# Patient Record
Sex: FEMALE | Race: WHITE | NOT HISPANIC OR LATINO | Employment: FULL TIME | URBAN - NONMETROPOLITAN AREA
[De-identification: names, ages, dates, MRNs, and addresses within clinical notes are randomized per-mention and may not be internally consistent; named-entity substitution may affect disease eponyms.]

---

## 2018-06-22 ENCOUNTER — HISTORICAL (OUTPATIENT)
Dept: ADMINISTRATIVE | Facility: HOSPITAL | Age: 38
End: 2018-06-22

## 2020-07-07 ENCOUNTER — HISTORICAL (OUTPATIENT)
Dept: ADMINISTRATIVE | Facility: HOSPITAL | Age: 40
End: 2020-07-07

## 2022-04-12 ENCOUNTER — HISTORICAL (OUTPATIENT)
Dept: ADMINISTRATIVE | Facility: HOSPITAL | Age: 42
End: 2022-04-12

## 2022-04-25 ENCOUNTER — HISTORICAL (OUTPATIENT)
Dept: ADMINISTRATIVE | Facility: HOSPITAL | Age: 42
End: 2022-04-25

## 2023-05-20 ENCOUNTER — HOSPITAL ENCOUNTER (OUTPATIENT)
Dept: RADIOLOGY | Facility: HOSPITAL | Age: 43
Discharge: HOME | End: 2023-05-20
Attending: NURSE PRACTITIONER
Payer: COMMERCIAL

## 2023-05-20 DIAGNOSIS — N63.0 LUMP OR MASS IN BREAST: ICD-10-CM

## 2023-05-20 PROCEDURE — 76641 ULTRASOUND BREAST COMPLETE: CPT | Mod: TC,RT

## 2024-01-09 ENCOUNTER — OFFICE VISIT (OUTPATIENT)
Dept: FAMILY MEDICINE | Facility: CLINIC | Age: 44
End: 2024-01-09
Payer: COMMERCIAL

## 2024-01-09 VITALS
HEART RATE: 72 BPM | SYSTOLIC BLOOD PRESSURE: 122 MMHG | TEMPERATURE: 99 F | DIASTOLIC BLOOD PRESSURE: 60 MMHG | OXYGEN SATURATION: 98 % | HEIGHT: 67 IN | BODY MASS INDEX: 31.52 KG/M2 | WEIGHT: 200.81 LBS

## 2024-01-09 DIAGNOSIS — Z00.01 ENCOUNTER FOR WELL ADULT EXAM WITH ABNORMAL FINDINGS: Primary | ICD-10-CM

## 2024-01-09 DIAGNOSIS — Z82.49 FAMILY HISTORY OF EARLY CAD: ICD-10-CM

## 2024-01-09 DIAGNOSIS — Z83.3 FAMILY HISTORY OF DIABETES MELLITUS (DM): ICD-10-CM

## 2024-01-09 DIAGNOSIS — J30.1 SEASONAL ALLERGIC RHINITIS DUE TO POLLEN: ICD-10-CM

## 2024-01-09 PROCEDURE — 3078F DIAST BP <80 MM HG: CPT | Mod: CPTII,,, | Performed by: FAMILY MEDICINE

## 2024-01-09 PROCEDURE — 3008F BODY MASS INDEX DOCD: CPT | Mod: CPTII,,, | Performed by: FAMILY MEDICINE

## 2024-01-09 PROCEDURE — 99386 PREV VISIT NEW AGE 40-64: CPT | Mod: ,,, | Performed by: FAMILY MEDICINE

## 2024-01-09 PROCEDURE — 1160F RVW MEDS BY RX/DR IN RCRD: CPT | Mod: CPTII,,, | Performed by: FAMILY MEDICINE

## 2024-01-09 PROCEDURE — 1159F MED LIST DOCD IN RCRD: CPT | Mod: CPTII,,, | Performed by: FAMILY MEDICINE

## 2024-01-09 PROCEDURE — 3074F SYST BP LT 130 MM HG: CPT | Mod: CPTII,,, | Performed by: FAMILY MEDICINE

## 2024-01-09 NOTE — ASSESSMENT & PLAN NOTE
Counseled on healthy cardiovascular activity with resistance training and healthy lifestyle choices     We will check fasting labs soon and call with results.    Otherwise, we will scheduled for return visit in 1 year with lab work after review of her fasting labs here in the next 1-2 weeks.  We will see her back sooner, if needed.

## 2024-01-09 NOTE — PROGRESS NOTES
"SUBJECTIVE:  HPI    Shante Crews is a 43 y.o. female here for [facility] (Establish care).     Both her mother and father has a history of early coronary artery disease and diabetes.  She is here to [facility].    Other than some seasonal allergic rhinitis symptoms, she is without any additional complaints.  She reports sneezing, runny nose and nasal congestion during certain times of the year.  All 1st and 2nd generation antihistamines make her drowsy.      Shante's allergies, medications, history, and problem list were updated as appropriate.    ROS:  Pertinent ROS as above, otherwise negative    OBJECTIVE:  Vital signs  Visit Vitals  /60 (BP Location: Left arm)   Pulse 72   Temp 98.5 °F (36.9 °C) (Temporal)   Ht 5' 7" (1.702 m)   Wt 91.1 kg (200 lb 12.8 oz)   SpO2 98%   BMI 31.45 kg/m²          PHYSICAL EXAM:  General: Awake, alert, no acute distress  ENT:  External auditory canals normal bilaterally .  Tympanic membranes normal bilaterally.  Oropharynx clear.  Bilateral turbinate edema pale turbinates  Neck:  No bruits, no masses  Cardiovascular:  Regular rhythm.  Normal rate.  No murmurs.  Respiratory: Clear to auscultation bilaterally, normal effort  Abdomen: Soft, nontender, nondistended, no hepatosplenomegaly   Extremities:  No cyanosis, no clubbing, no edema  Skin:  No rashes or appreciable lesions   Neuro:  Cranial nerves 2-12 are intact with usual testing.  Gait is normal.  Moves all 4 extremities equally and symmetrically.  Psychiatric:  Normal mood and affect.      ASSESSMENT/PLAN:  1. Encounter for well adult exam with abnormal findings  Assessment & Plan:  Counseled on healthy cardiovascular activity with resistance training and healthy lifestyle choices     We will check fasting labs soon and call with results.    Otherwise, we will scheduled for return visit in 1 year with lab work after review of her fasting labs here in the next 1-2 weeks.  We will see her back sooner, if " needed.    Orders:  -     CBC Auto Differential; Future; Expected date: 01/09/2024  -     Comprehensive Metabolic Panel; Future; Expected date: 01/09/2024  -     Hemoglobin A1C; Future; Expected date: 01/09/2024  -     Lipid Panel; Future; Expected date: 02/09/2024  -     TSH; Future; Expected date: 01/09/2024    2. Seasonal allergic rhinitis due to pollen  Assessment & Plan:  Trial of fluticasone nasal spray      3. Family history of early CAD  Overview:  Mother with MI  Father with MI    Assessment & Plan:  Check fasting lipid profile for screening purposes    Orders:  -     CBC Auto Differential; Future; Expected date: 01/09/2024  -     Comprehensive Metabolic Panel; Future; Expected date: 01/09/2024  -     Hemoglobin A1C; Future; Expected date: 01/09/2024  -     Lipid Panel; Future; Expected date: 02/09/2024  -     TSH; Future; Expected date: 01/09/2024    4. Family history of diabetes mellitus (DM)  Overview:  Mother and father    Assessment & Plan:  Screen for diabetes with hemoglobin A1c and fasting blood glucose    Orders:  -     CBC Auto Differential; Future; Expected date: 01/09/2024  -     Comprehensive Metabolic Panel; Future; Expected date: 01/09/2024  -     Hemoglobin A1C; Future; Expected date: 01/09/2024  -     Lipid Panel; Future; Expected date: 02/09/2024  -     TSH; Future; Expected date: 01/09/2024        Follow Up:  Follow up for Fasting labs.  Call with these results and then scheduled for return visit in 1 year, unless she needs to come back sooner based on results.

## 2024-04-15 PROBLEM — Z00.01 ENCOUNTER FOR WELL ADULT EXAM WITH ABNORMAL FINDINGS: Status: RESOLVED | Noted: 2024-01-09 | Resolved: 2024-04-15

## 2024-06-18 ENCOUNTER — TELEPHONE (OUTPATIENT)
Dept: FAMILY MEDICINE | Facility: CLINIC | Age: 44
End: 2024-06-18

## 2024-06-18 ENCOUNTER — OFFICE VISIT (OUTPATIENT)
Dept: FAMILY MEDICINE | Facility: CLINIC | Age: 44
End: 2024-06-18
Payer: OTHER GOVERNMENT

## 2024-06-18 ENCOUNTER — HOSPITAL ENCOUNTER (OUTPATIENT)
Dept: RADIOLOGY | Facility: HOSPITAL | Age: 44
Discharge: HOME | End: 2024-06-18
Attending: FAMILY MEDICINE
Payer: OTHER GOVERNMENT

## 2024-06-18 VITALS
HEIGHT: 67 IN | WEIGHT: 198.81 LBS | TEMPERATURE: 98 F | SYSTOLIC BLOOD PRESSURE: 112 MMHG | DIASTOLIC BLOOD PRESSURE: 82 MMHG | HEART RATE: 73 BPM | OXYGEN SATURATION: 98 % | BODY MASS INDEX: 31.2 KG/M2

## 2024-06-18 DIAGNOSIS — M54.31 SCIATICA OF RIGHT SIDE: Primary | ICD-10-CM

## 2024-06-18 DIAGNOSIS — M54.31 SCIATICA OF RIGHT SIDE: ICD-10-CM

## 2024-06-18 PROCEDURE — 72114 X-RAY EXAM L-S SPINE BENDING: CPT | Mod: TC

## 2024-06-18 PROCEDURE — 99214 OFFICE O/P EST MOD 30 MIN: CPT | Mod: ,,, | Performed by: FAMILY MEDICINE

## 2024-06-18 RX ORDER — DICLOFENAC SODIUM 50 MG/1
50 TABLET, DELAYED RELEASE ORAL 2 TIMES DAILY
COMMUNITY
Start: 2024-06-14

## 2024-06-18 RX ORDER — HYDROCODONE BITARTRATE AND ACETAMINOPHEN 5; 325 MG/1; MG/1
1 TABLET ORAL EVERY 6 HOURS PRN
Qty: 12 TABLET | Refills: 0 | Status: SHIPPED | OUTPATIENT
Start: 2024-06-18

## 2024-06-18 RX ORDER — TIZANIDINE 4 MG/1
4 TABLET ORAL 2 TIMES DAILY PRN
COMMUNITY
Start: 2024-06-14

## 2024-06-18 RX ORDER — PREDNISONE 10 MG/1
TABLET ORAL
Qty: 54 TABLET | Refills: 0 | Status: SHIPPED | OUTPATIENT
Start: 2024-06-18 | End: 2024-07-07

## 2024-06-18 NOTE — ASSESSMENT & PLAN NOTE
Probably secondary to disc herniation.  Discussed natural course and prognosis and treatment recommendations.    Prednisone taper over 20 days     Hydrocodone acetaminophen as needed for pain     Check x-rays     Return to clinic in 3 weeks

## 2024-06-18 NOTE — PROGRESS NOTES
"SUBJECTIVE:  HPI    Shante Crews is a 43 y.o. female here for Back Pain and sciatic nerve pain.     She reports an approximately one-week history of right-sided low back pain.  The pain is constant but exacerbates with certain movements.  The pain radiates into the right gluteal region.  This is similar pain to what she had about a year ago.  She had x-rays and an MRI at that time.  Her symptoms resolve with physical therapy.    On this occasion, her pain began after she flexed at the trunk in her kitchen and lifted something.  She denies any paresthesias or weakness.  She denies any bowel or bladder dysfunction.  However, she does note that she has had some stress urinary incontinence of mild severity over the last several months.    Shante's allergies, medications, history, and problem list were updated as appropriate.    ROS:  Pertinent ROS as above, otherwise negative    OBJECTIVE:  Vital signs  Visit Vitals  /82 (BP Location: Right arm, Patient Position: Sitting)   Pulse 73   Temp 97.5 °F (36.4 °C) (Temporal)   Ht 5' 7.01" (1.702 m)   Wt 90.2 kg (198 lb 12.8 oz)   SpO2 98%   BMI 31.13 kg/m²          PHYSICAL EXAM:  General: Awake, alert, no acute distress  Back: Point tenderness to palpation in the right gluteal region over the sciatic nerve.  Increased pain with hyperextension and right leg crossed over left.  Straight leg raise positive on the right supine and sitting.  Neuro:  Motor 5/5 in bilateral lower extremities.  Sensation light touch intact bilateral lower extremities.  Reflexes 1+ bilateral lower extremities.      ASSESSMENT/PLAN:  1. Sciatica of right side  Assessment & Plan:  Probably secondary to disc herniation.  Discussed natural course and prognosis and treatment recommendations.    Prednisone taper over 20 days     Hydrocodone acetaminophen as needed for pain     Check x-rays     Return to clinic in 3 weeks    Orders:  -     predniSONE (DELTASONE) 10 MG tablet; Take 6 tablets (60 mg " total) by mouth once daily for 4 days, THEN 4 tablets (40 mg total) once daily for 4 days, THEN 2 tablets (20 mg total) once daily for 4 days, THEN 1 tablet (10 mg total) once daily for 4 days, THEN 0.5 tablets (5 mg total) once daily for 4 days.  Dispense: 54 tablet; Refill: 0  -     HYDROcodone-acetaminophen (NORCO) 5-325 mg per tablet; Take 1 tablet by mouth every 6 (six) hours as needed for Pain.  Dispense: 12 tablet; Refill: 0  -     X-Ray Lumbar Complete Including Flex And Ext; Future; Expected date: 06/18/2024      Follow Up:  Follow up in about 3 weeks (around 7/9/2024) for Right-sided sciatica.

## 2024-06-18 NOTE — TELEPHONE ENCOUNTER
----- Message from López Robles MD sent at 6/18/2024  4:40 PM CDT -----  X-rays do confirm mild arthritic and degenerative disc changes    We will discuss further on her return to clinic visit   [employer] [facility]

## 2024-07-09 ENCOUNTER — OFFICE VISIT (OUTPATIENT)
Dept: OBSTETRICS AND GYNECOLOGY | Facility: CLINIC | Age: 44
End: 2024-07-09
Payer: OTHER GOVERNMENT

## 2024-07-09 ENCOUNTER — OFFICE VISIT (OUTPATIENT)
Dept: FAMILY MEDICINE | Facility: CLINIC | Age: 44
End: 2024-07-09
Payer: OTHER GOVERNMENT

## 2024-07-09 VITALS
BODY MASS INDEX: 31.55 KG/M2 | WEIGHT: 201 LBS | SYSTOLIC BLOOD PRESSURE: 120 MMHG | TEMPERATURE: 98 F | DIASTOLIC BLOOD PRESSURE: 70 MMHG | HEIGHT: 67 IN

## 2024-07-09 VITALS
BODY MASS INDEX: 31.55 KG/M2 | SYSTOLIC BLOOD PRESSURE: 118 MMHG | HEART RATE: 73 BPM | HEIGHT: 67 IN | TEMPERATURE: 98 F | OXYGEN SATURATION: 96 % | DIASTOLIC BLOOD PRESSURE: 64 MMHG | WEIGHT: 201 LBS

## 2024-07-09 DIAGNOSIS — R31.9 HEMATURIA, UNSPECIFIED TYPE: ICD-10-CM

## 2024-07-09 DIAGNOSIS — M47.816 SPONDYLOSIS OF LUMBAR REGION WITHOUT MYELOPATHY OR RADICULOPATHY: Primary | ICD-10-CM

## 2024-07-09 DIAGNOSIS — Z01.411 ABNORMAL GYNECOLOGICAL EXAMINATION: Primary | ICD-10-CM

## 2024-07-09 DIAGNOSIS — M54.31 SCIATICA OF RIGHT SIDE: ICD-10-CM

## 2024-07-09 DIAGNOSIS — N39.3 URINARY, INCONTINENCE, STRESS FEMALE: ICD-10-CM

## 2024-07-09 DIAGNOSIS — R32 BLADDER LEAK: ICD-10-CM

## 2024-07-09 DIAGNOSIS — Z23 NEED FOR HPV VACCINATION: ICD-10-CM

## 2024-07-09 LAB
BILIRUB UR QL STRIP: NEGATIVE
GLUCOSE UR QL STRIP: NEGATIVE
KETONES UR QL STRIP: NEGATIVE
LEUKOCYTE ESTERASE UR QL STRIP: NEGATIVE
PH, POC UA: 5.5
POC BLOOD, URINE: POSITIVE
POC NITRATES, URINE: NEGATIVE
PROT UR QL STRIP: NEGATIVE
SP GR UR STRIP: 1.03 (ref 1–1.03)
UROBILINOGEN UR STRIP-ACNC: 0.2 (ref 0.1–1.1)

## 2024-07-09 PROCEDURE — 90471 IMMUNIZATION ADMIN: CPT | Mod: ,,, | Performed by: NURSE PRACTITIONER

## 2024-07-09 PROCEDURE — 90651 9VHPV VACCINE 2/3 DOSE IM: CPT | Mod: ,,, | Performed by: NURSE PRACTITIONER

## 2024-07-09 PROCEDURE — 87624 HPV HI-RISK TYP POOLED RSLT: CPT | Performed by: NURSE PRACTITIONER

## 2024-07-09 PROCEDURE — 81003 URINALYSIS AUTO W/O SCOPE: CPT | Mod: QW,,, | Performed by: NURSE PRACTITIONER

## 2024-07-09 PROCEDURE — 87591 N.GONORRHOEAE DNA AMP PROB: CPT | Performed by: NURSE PRACTITIONER

## 2024-07-09 PROCEDURE — 99213 OFFICE O/P EST LOW 20 MIN: CPT | Mod: ,,, | Performed by: FAMILY MEDICINE

## 2024-07-09 PROCEDURE — 99386 PREV VISIT NEW AGE 40-64: CPT | Mod: 25,,, | Performed by: NURSE PRACTITIONER

## 2024-07-09 PROCEDURE — 87491 CHLMYD TRACH DNA AMP PROBE: CPT | Performed by: NURSE PRACTITIONER

## 2024-07-09 PROCEDURE — 87086 URINE CULTURE/COLONY COUNT: CPT | Performed by: NURSE PRACTITIONER

## 2024-07-09 PROCEDURE — 87661 TRICHOMONAS VAGINALIS AMPLIF: CPT | Performed by: NURSE PRACTITIONER

## 2024-07-09 NOTE — ASSESSMENT & PLAN NOTE
Discussed prognosis     Discussed signs and symptoms and reasons to return    Discussed preventative measures

## 2024-07-09 NOTE — PROGRESS NOTES
Chief Complaint: Annual exam    Chief Complaint   Patient presents with    Well Woman     Annual, pt states last pap was 2 years ago abnormal cells with hx of HPV. KIMBERLYN signed. Pt would like to discuss bladder issues.        HPI:   43 y.o. F  presents for initial gyn exam.    Reports recent bladder leakage x last 2 months, onset after using vibrator with suction. Stopped using vibrator and symptoms have improved somewhat.  Reports leakage with coughing, sneezing, denies urgency, frequency, nocturia, dysuria, pressure.      Due for MMG.  Last pap 2 years ago with HPV.  Recently dealing with sciatica      Labs / Significant Studies:  Gyn History:    Menstrual History   Cycle: No  Menarche Age: 14 years  No Cycle Reason: (!) Surgical  Surgical Reason: ablation  [place]  Sexually Active: Yes  Sexual Orientation: homosexual  Postcoital Bleeding: No  Dyspareunia: No  STI History: Yes  STI Type:  (hpv)  Contraception: N/a  Menopause  Menopause Age: 1 years  Post Menopausal Bleeding: No  Hormone Replacement Therapy: No  Breast History  Last Breast Imaging Date: Yes  Date: 23  History of Abnormal Breast Imaging : (!) Yes (benign)  History of Breast Biopsy: No  Pap History   Last pap date:  (2 years ago abnormal per pt.)  Result: (!) Abnormal  History of Abnormal Pap: (!) Yes  HPV Vaccine Completed: No        Family History   Problem Relation Name Age of Onset    Breast cancer Neg Hx      Cervical cancer Neg Hx      Ovarian cancer Neg Hx      Uterine cancer Neg Hx           Past Medical History:   Diagnosis Date    Spondylosis of lumbar region without myelopathy or radiculopathy 2024 lumbar spine x-rays:  mild to moderate facet joint to lesser extent vertebral body spurring noted throughout the lumbar spine. Minimal, bilaterally symmetric degenerative changes are noted involving both SI joints        Past Surgical History:   Procedure Laterality Date    ABLATION      HAND TENDON SURGERY  "Left     TUBAL LIGATION  2007     No current outpatient medications on file.    Review of patient's allergies indicates:  No Known Allergies    Social History     Tobacco Use    Smoking status: Never    Smokeless tobacco: Never   Substance Use Topics    Alcohol use: Yes     Comment: socially    Drug use: Never       Review of Systems:  General/Constitutional: Chills denies. Fatigue/weakness denies. Fever denies. Night sweats denies. Hot flashes denies    Respiratory: Cough denies. Hemoptysis denies. SOB denies. Sputum production denies. Wheezing denies .   Cardiovascular: Chest pain denies . Dizziness denies. Palpitations denies. Swelling in hands/feet denies    Gastrointestinal: Abdominal pain denies. Blood in stool denies. Constipation denies. Diarrhea denies. Heartburn denies. Nausea denies. Vomiting denies    Genitourinary: Incontinence denies. Blood in urine denies. Frequent urination denies. Painful urination denies. Urinary urgency denies. Nocturia denies    Gynecologic: Irregular menses denies. Heavy bleeding denies. Painful menses denies. Vaginal discharge denies. Vaginal odor denies. Vaginal itching denies. Vaginal lesion denies. Pelvic pain denies. Decreased libido denies. Vulvar lesion denies. Prolapse of genital organs denies. Painful intercourse denies. Postcoital bleeding denies    Psychiatric: Depression denies. Anxiety denies     Physical Exam:   Vitals:    07/09/24 1454   BP: 120/70   Temp: 98.2 °F (36.8 °C)   Weight: 91.2 kg (201 lb)   Height: 5' 7" (1.702 m)       Body mass index is 31.48 kg/m².       Chaperone: present.     General appearance: healthy, well-nourished and well-developed     Psychiatric: Orientation to time, place and person. Normal mood and affect and active, alert     Skin: Appearance: no rashes or lesions.     Neck:   Neck: supple, FROM, trachea midline. and no masses   Thyroid: no enlargement or nodules and non-tender.       Cardiovascular:   Auscultation: RRR and no murmur. "   Peripheral Vascular: no varicosities, LLE edema, RLE edema, calf tenderness, and palpable cord and pedal pulses intact.     Lungs:   Respiratory effort: no intercostal retractions or accessory muscle usage.   Auscultation: no wheezing, rales/crackles, or rhonchi and clear to auscultation.     Breast:   Inspection/Palpation: no tenderness, discrete/distinct masses, skin changes, or abnormal secretions. Nipple appearance normal.     Abdomen:   Auscultation/Inspection/Palpation: no hepatomegaly, splenomegaly, masses, tenderness or CVA tenderness and soft, non-distended bowel sounds preset.    Hernia: no palpable hernias.     Female Genitalia:    Vulva: no masses, tenderness or lesions    Bladder/Urethra: no urethral discharge or mass, normal meatus, bladder non-distended.    Vagina: no tenderness, erythema, cystocele, rectocele, abnormal vaginal discharge or vesicle(s) or ulcers    Cervix: no discharge, no cervical lacerations noted or motion tenderness and grossly normal    Uterus: normal size and shape and midline, non-tender, and no uterine prolapse.    Adnexa/Parametria: no parametrial tenderness or mass, no adnexal tenderness or ovarian mass.     Lymph Nodes:   Palpation: non tender submandibular nodes, axillary nodes, or inguinal nodes.     Rectal Exam:   Rectum: normal perianal skin.       Assessment:     Patient Active Problem List   Diagnosis    Family history of early CAD    Family history of diabetes mellitus (DM)    Seasonal allergic rhinitis due to pollen    Sciatica of right side    Spondylosis of lumbar region without myelopathy or radiculopathy       Health Maintenance Due   Topic Date Due    Hepatitis C Screening  Never done    Lipid Panel  Never done    HIV Screening  Never done    TETANUS VACCINE  Never done    Mammogram  Never done    Hemoglobin A1c (Diabetic Prevention Screening)  08/02/2014    Cervical Cancer Screening  09/18/2017    COVID-19 Vaccine (1 - 2023-24 season) Never done     Health  Maintenance Topics with due status: Not Due       Topic Last Completion Date    Influenza Vaccine Not Due         Plan:    Shante was seen today for well woman.    Diagnoses and all orders for this visit:    Abnormal gynecological examination  PAP with cultures  Counseled regarding contraceptive options, and need for contraception until no menses for 1 year.  Reviewed calcium needs, exercise, and prevention of osteoporosis.  Healthy diet and light weightbearing exercise if tolerated.  Reviewed normal perimenopausal transition.  Mammogram recommended yearly.  Colonoscopy recommended at age 45.  RTC 1 y   Bladder leak  -     POCT Urinalysis, Dipstick, Automated, W/O Scope  -     Urine Culture High Risk    Urinary, incontinence, stress female  Encouraged pelvic floor strengthening exercises  Need for HPV vaccination    - HPV infection is acquired through direct genital contact.     - Educated she may be at risk for other sexually transmitted diseases     - Advised pt on Gardasil vaccine and correct doses to be administered     - Pt tolerated well and will return 2 months for next injection.     Hematuria, unspecified type  Urine C&S

## 2024-07-12 LAB — BACTERIA UR CULT: NO GROWTH

## 2024-12-17 ENCOUNTER — OFFICE VISIT (OUTPATIENT)
Dept: OBSTETRICS AND GYNECOLOGY | Facility: CLINIC | Age: 44
End: 2024-12-17
Payer: OTHER GOVERNMENT

## 2024-12-17 ENCOUNTER — LAB VISIT (OUTPATIENT)
Dept: LAB | Facility: HOSPITAL | Age: 44
End: 2024-12-17
Attending: NURSE PRACTITIONER
Payer: OTHER GOVERNMENT

## 2024-12-17 VITALS
BODY MASS INDEX: 31.39 KG/M2 | SYSTOLIC BLOOD PRESSURE: 112 MMHG | WEIGHT: 200 LBS | HEIGHT: 67 IN | DIASTOLIC BLOOD PRESSURE: 80 MMHG | TEMPERATURE: 97 F

## 2024-12-17 DIAGNOSIS — N95.1 MENOPAUSAL SYMPTOMS: ICD-10-CM

## 2024-12-17 DIAGNOSIS — Z23 NEED FOR HPV VACCINATION: Primary | ICD-10-CM

## 2024-12-17 LAB
ESTRADIOL SERPL HS-MCNC: 58 PG/ML
FSH SERPL-ACNC: 21.84 MIU/ML

## 2024-12-17 PROCEDURE — 83001 ASSAY OF GONADOTROPIN (FSH): CPT

## 2024-12-17 PROCEDURE — 36415 COLL VENOUS BLD VENIPUNCTURE: CPT

## 2024-12-17 PROCEDURE — 82670 ASSAY OF TOTAL ESTRADIOL: CPT

## 2024-12-17 NOTE — PROGRESS NOTES
Chief Complaint:     Chief Complaint   Patient presents with    Gardasil     2nd Gardasil, last injection 24. Pt would also like to discuss hot flashes, mood swings, and insomnia x7 months.          HPI:   44 y.o.  F   presents for 2nd Gardasil injection.   Patient complains of hot flashes, mood swings and insomnia x7 months.  Patient is status post tubal and endometrial ablation, does not have any vaginal bleeding.      Gyn History:    Menstrual History  Cycle: No  Menarche Age: 14 years  No Cycle Reason: (!) Surgical  Surgical Reason: ablation    Menopause  Menopause Age: 1 years  Post Menopausal Bleeding: No  Hormone Replacement Therapy: No    Pap History  Last pap date: 24  Result: Normal  History of Abnormal Pap: (!) Yes (per pt 2 years ago)  HPV Vaccine Completed: No (1/3)    [place]  Sexually Active: Yes  Sexual Orientation: homosexual  Postcoital Bleeding: No  Dyspareunia: No  STI History: Yes (HPV)  Contraception: Yes    Breast History  Last Breast Imaging Date: Yes  Date: 23  History of Abnormal Breast Imaging : (!) Yes  History of Breast Biopsy: No        No current outpatient medications on file.  No current facility-administered medications for this visit.    Review of patient's allergies indicates:  No Known Allergies    Social History     Tobacco Use    Smoking status: Never    Smokeless tobacco: Never   Substance Use Topics    Alcohol use: Yes     Comment: socially    Drug use: Never       Review of Systems:  General/Constitutional:  Chills denies. Fatigue/weakness denies. Fever denies . Night sweats admits hot flashes admits.  Mood swings admits  Gastrointestinal:  Abdominal pain denies. Blood in stool denies. Constipation denies. Diarrhea denies. Heartburn denies. Nausea denies. Vomiting denies  Genitourinary:  Incontinence denies. Blood in urine denies. Frequent urination denies. Urgency denies. Painful urination denies.  Gynecologic:  Irregular menses denies. Heavy  bleeding denies. Painful menses denies. Vaginal discharge denies. Vaginal odor denies. Vaginal lesion denies. Pelvic pain denies. Decreased libido denies. Vulvar lesion denies. Prolapse of genital organs denies. Painful intercourse denies.      Physical Exam:   Vitals:    12/17/24 0920   BP: 112/80   Temp: 96.8 °F (36 °C)     Body mass index is 31.32 kg/m².    Chaperone present.    Constitutional: General appearance, healthy, well-nourished and well-developed.  Psychiatric: Orientation to time, place, and person.         Mood and Affect: normal mood and affect and active, alert.    Assessment:     Patient Active Problem List   Diagnosis    Family history of early CAD    Family history of diabetes mellitus (DM)    Seasonal allergic rhinitis due to pollen    Sciatica of right side    Spondylosis of lumbar region without myelopathy or radiculopathy       Health Maintenance Due   Topic Date Due    Hepatitis C Screening  Never done    Lipid Panel  Never done    HIV Screening  Never done    TETANUS VACCINE  Never done    Mammogram  Never done    Hemoglobin A1c (Diabetic Prevention Screening)  08/02/2014    Colorectal Cancer Screening  Never done    Influenza Vaccine (1) Never done    COVID-19 Vaccine (1 - 2024-25 season) Never done     Health Maintenance Topics with due status: Not Due       Topic Last Completion Date    Cervical Cancer Screening 07/09/2024    RSV Vaccine (Age 60+ and Pregnant patients) Not Due           Plan:    Shante was seen today for gardasil.    Diagnoses and all orders for this visit:    Need for HPV vaccination  - HPV is a common viral infection that manifests in some patients as anogenital warts.      - HPV infection is acquired through direct genital contact.     - Educated she may be at risk for other sexually transmitted diseases     - Advised pt on Gardasil vaccine and correct doses to be administered     - Pt tolerated well and will return 4months  for next injection.    -     hpv  vaccine,9-dev (GARDASIL 9) vaccine 0.5 mL    Menopausal symptoms  FSH, estradiol - if menopausal, will consider Duavee  -     Follicle Stimulating Hormone; Future  -     Estradiol; Future

## 2024-12-18 ENCOUNTER — TELEPHONE (OUTPATIENT)
Dept: OBSTETRICS AND GYNECOLOGY | Facility: CLINIC | Age: 44
End: 2024-12-18
Payer: OTHER GOVERNMENT

## 2024-12-18 DIAGNOSIS — R23.2 HOT FLASHES: Primary | ICD-10-CM

## 2024-12-18 RX ORDER — CONJUGATED ESTROGENS/BAZEDOXIFENE .45; 2 MG/1; MG/1
1 TABLET, FILM COATED ORAL DAILY
Qty: 28 TABLET | Refills: 3 | Status: SHIPPED | OUTPATIENT
Start: 2024-12-18

## 2024-12-18 NOTE — TELEPHONE ENCOUNTER
Per SL send out Duavee 1qday and return to clinic in march for F/U.   Contacted pt educated on medication and dosage. Pt verbalized understanding.

## 2024-12-18 NOTE — TELEPHONE ENCOUNTER
----- Message from Dali sent at 12/18/2024  3:13 PM CST -----  Regarding: Call Back  Type:  Patient Returning Call    Who Called:  Who Left Message for Patient:  Does the patient know what this is regarding?:  Would the patient rather a call back or a response via [facility]?   Best Call Back Number:[phone]  Additional Information: Pt said she was told to call back today in regards to blood work and if no note in chart to speak with Gissel?

## 2025-05-06 ENCOUNTER — TELEPHONE (OUTPATIENT)
Dept: OBSTETRICS AND GYNECOLOGY | Facility: CLINIC | Age: 45
End: 2025-05-06
Payer: OTHER GOVERNMENT

## 2025-05-06 DIAGNOSIS — Z12.31 SCREENING MAMMOGRAM FOR BREAST CANCER: Primary | ICD-10-CM

## 2025-05-06 NOTE — TELEPHONE ENCOUNTER
----- Message from Brylee sent at 5/6/2025 10:53 AM CDT -----  Regarding: Orders  Contact: Shante  Type:  MammogramCaller is requesting to schedule their annual mammogram appointment.  Order is not listed in EPIC.  Please enter order and contact patient to schedule.Name of Caller:Bashir would they like the mammogram performed?OALHWould the patient rather a call back or a response via [facility]? Call [facility] Call Back Number:[phone] Additional Information: Pt needs mmg orders

## 2025-05-13 ENCOUNTER — HOSPITAL ENCOUNTER (OUTPATIENT)
Dept: RADIOLOGY | Facility: HOSPITAL | Age: 45
Discharge: HOME | End: 2025-05-13
Attending: NURSE PRACTITIONER
Payer: OTHER GOVERNMENT

## 2025-05-13 DIAGNOSIS — Z12.31 SCREENING MAMMOGRAM FOR BREAST CANCER: ICD-10-CM

## 2025-05-13 PROCEDURE — 77063 BREAST TOMOSYNTHESIS BI: CPT | Mod: TC

## 2025-05-19 ENCOUNTER — RESULTS FOLLOW-UP (OUTPATIENT)
Dept: OBSTETRICS AND GYNECOLOGY | Facility: CLINIC | Age: 45
End: 2025-05-19

## 2025-05-19 DIAGNOSIS — R92.8 ABNORMAL MAMMOGRAM OF RIGHT BREAST: Primary | ICD-10-CM

## 2025-05-19 NOTE — PROGRESS NOTES
Needs additional imaging.  Please send orders for 3D right diagnostic mammogram and possible targeted right breast ultrasound

## 2025-05-19 NOTE — PROGRESS NOTES
Phoned patient, informed of results per SL. Orders sent for dx MMG and US on R breast. Patient verbalized understanding and agrees with plan of care.

## 2025-05-30 DIAGNOSIS — R23.2 HOT FLASHES: ICD-10-CM

## 2025-06-02 RX ORDER — CONJUGATED ESTROGENS/BAZEDOXIFENE .45; 2 MG/1; MG/1
1 TABLET, FILM COATED ORAL
Qty: 30 TABLET | Refills: 3 | Status: SHIPPED | OUTPATIENT
Start: 2025-06-02

## 2025-06-03 ENCOUNTER — HOSPITAL ENCOUNTER (OUTPATIENT)
Dept: RADIOLOGY | Facility: HOSPITAL | Age: 45
Discharge: HOME | End: 2025-06-03
Attending: NURSE PRACTITIONER
Payer: OTHER GOVERNMENT

## 2025-06-03 VITALS — BODY MASS INDEX: 30.61 KG/M2 | WEIGHT: 195 LBS | HEIGHT: 67 IN

## 2025-06-03 DIAGNOSIS — R92.8 ABNORMAL MAMMOGRAM OF RIGHT BREAST: ICD-10-CM

## 2025-06-03 PROCEDURE — 76642 ULTRASOUND BREAST LIMITED: CPT | Mod: 26,RT,, | Performed by: STUDENT IN AN ORGANIZED HEALTH CARE EDUCATION/TRAINING PROGRAM

## 2025-06-03 PROCEDURE — 76642 ULTRASOUND BREAST LIMITED: CPT | Mod: TC,RT

## 2025-06-03 PROCEDURE — 77061 BREAST TOMOSYNTHESIS UNI: CPT | Mod: 26,RT,, | Performed by: STUDENT IN AN ORGANIZED HEALTH CARE EDUCATION/TRAINING PROGRAM

## 2025-06-03 PROCEDURE — 77065 DX MAMMO INCL CAD UNI: CPT | Mod: 26,RT,, | Performed by: STUDENT IN AN ORGANIZED HEALTH CARE EDUCATION/TRAINING PROGRAM

## 2025-06-03 PROCEDURE — 77061 BREAST TOMOSYNTHESIS UNI: CPT | Mod: TC,RT

## 2025-06-04 ENCOUNTER — RESULTS FOLLOW-UP (OUTPATIENT)
Dept: OBSTETRICS AND GYNECOLOGY | Facility: CLINIC | Age: 45
End: 2025-06-04

## 2025-09-04 ENCOUNTER — OFFICE VISIT (OUTPATIENT)
Dept: OBSTETRICS AND GYNECOLOGY | Facility: CLINIC | Age: 45
End: 2025-09-04
Payer: OTHER GOVERNMENT

## 2025-09-04 VITALS
WEIGHT: 185.19 LBS | HEIGHT: 67 IN | SYSTOLIC BLOOD PRESSURE: 118 MMHG | BODY MASS INDEX: 29.07 KG/M2 | DIASTOLIC BLOOD PRESSURE: 74 MMHG

## 2025-09-04 DIAGNOSIS — Z01.419 ROUTINE GYNECOLOGICAL EXAMINATION: Primary | ICD-10-CM

## 2025-09-04 DIAGNOSIS — N60.11 BILATERAL FIBROCYSTIC BREAST CHANGES: ICD-10-CM

## 2025-09-04 DIAGNOSIS — Z79.890 HORMONE REPLACEMENT THERAPY (HRT): ICD-10-CM

## 2025-09-04 DIAGNOSIS — R23.2 HOT FLASHES: ICD-10-CM

## 2025-09-04 DIAGNOSIS — N60.12 BILATERAL FIBROCYSTIC BREAST CHANGES: ICD-10-CM

## 2025-09-04 PROCEDURE — 99396 PREV VISIT EST AGE 40-64: CPT | Mod: ,,, | Performed by: NURSE PRACTITIONER

## 2025-09-04 RX ORDER — PANTOPRAZOLE SODIUM 40 MG/1
40 TABLET, DELAYED RELEASE ORAL DAILY
COMMUNITY
Start: 2025-08-22